# Patient Record
Sex: MALE | Race: WHITE | Employment: OTHER | URBAN - METROPOLITAN AREA
[De-identification: names, ages, dates, MRNs, and addresses within clinical notes are randomized per-mention and may not be internally consistent; named-entity substitution may affect disease eponyms.]

---

## 2020-01-15 ENCOUNTER — HOSPITAL ENCOUNTER (OUTPATIENT)
Dept: GENERAL RADIOLOGY | Age: 75
Discharge: HOME | End: 2020-01-15
Payer: MEDICARE

## 2020-01-15 DIAGNOSIS — T14.90XA SOFT TISSUE INJURY: ICD-10-CM

## 2020-01-15 PROCEDURE — 70140 X-RAY EXAM OF FACIAL BONES: CPT

## 2021-01-15 ENCOUNTER — OFFICE VISIT (OUTPATIENT)
Dept: NEUROLOGY | Age: 76
End: 2021-01-15
Payer: MEDICARE

## 2021-01-15 VITALS
WEIGHT: 247 LBS | DIASTOLIC BLOOD PRESSURE: 70 MMHG | BODY MASS INDEX: 36.58 KG/M2 | HEART RATE: 78 BPM | HEIGHT: 69 IN | RESPIRATION RATE: 16 BRPM | SYSTOLIC BLOOD PRESSURE: 140 MMHG | OXYGEN SATURATION: 98 %

## 2021-01-15 DIAGNOSIS — R41.3 MEMORY LOSS: Primary | ICD-10-CM

## 2021-01-15 DIAGNOSIS — G47.52 RBD (REM BEHAVIORAL DISORDER): ICD-10-CM

## 2021-01-15 DIAGNOSIS — G20 PARKINSONISM, UNSPECIFIED PARKINSONISM TYPE (HCC): ICD-10-CM

## 2021-01-15 PROCEDURE — 99205 OFFICE O/P NEW HI 60 MIN: CPT | Performed by: PSYCHIATRY & NEUROLOGY

## 2021-01-15 PROCEDURE — 1101F PT FALLS ASSESS-DOCD LE1/YR: CPT | Performed by: PSYCHIATRY & NEUROLOGY

## 2021-01-15 PROCEDURE — G8417 CALC BMI ABV UP PARAM F/U: HCPCS | Performed by: PSYCHIATRY & NEUROLOGY

## 2021-01-15 PROCEDURE — G8432 DEP SCR NOT DOC, RNG: HCPCS | Performed by: PSYCHIATRY & NEUROLOGY

## 2021-01-15 PROCEDURE — G8536 NO DOC ELDER MAL SCRN: HCPCS | Performed by: PSYCHIATRY & NEUROLOGY

## 2021-01-15 PROCEDURE — G8427 DOCREV CUR MEDS BY ELIG CLIN: HCPCS | Performed by: PSYCHIATRY & NEUROLOGY

## 2021-01-15 RX ORDER — CLOPIDOGREL BISULFATE 75 MG/1
TABLET ORAL
COMMUNITY

## 2021-01-15 RX ORDER — GLUCOSAMINE SULFATE 1500 MG
POWDER IN PACKET (EA) ORAL DAILY
COMMUNITY

## 2021-01-15 RX ORDER — FELODIPINE 5 MG/1
5 TABLET, EXTENDED RELEASE ORAL DAILY
COMMUNITY

## 2021-01-15 RX ORDER — CARBIDOPA AND LEVODOPA 25; 100 MG/1; MG/1
TABLET ORAL
Qty: 90 TAB | Refills: 1 | Status: SHIPPED | OUTPATIENT
Start: 2021-01-15 | End: 2021-03-15 | Stop reason: SINTOL

## 2021-01-15 RX ORDER — PRAVASTATIN SODIUM 40 MG/1
40 TABLET ORAL
COMMUNITY

## 2021-01-15 RX ORDER — LISINOPRIL 10 MG/1
40 TABLET ORAL DAILY
COMMUNITY

## 2021-01-15 NOTE — PROGRESS NOTES
Neurology Consult Note      HISTORY PROVIDED BY: patient and spouse    Chief Complaint:   Chief Complaint   Patient presents with    Memory Loss      Subjective: Jose Manuel Mijares is a 67 y.o. right handed male who presents in consultation for memory loss. He has noticed difficulty with memory along with his wife. He gives example of going into a room and not remembering why he went in there. He feels it is age related changes. He is driving and has not gotten lost. His wife gives an example of repeating questions. He has had trouble putting hearing aids in and forgetting how to put his meds together. He had a fall recently and realized he had been putting his meds in the pill box incorrectly. Often asks where are they going and what they are doing repeatedly. She started noticing trouble a year ago, worse in last 6 months. No family h/o memory loss. He has started shuffling, soft voice, messy hand writing. No tremor. He has fallen out of bed several times, screams and yells and moving arms during sleep. No snoring. Falling asleep during day time, always has. Up during night to go to the bathroom. He had an MRI brain yesterday, I do not have the report. Notes from PCP reviewed: Office visit 12/18/2020-mentions difficulty keeping up with medications, difficulty getting his hearing aids in, episodes of confusion with the timing of things, very active nightmares and 2 episodes of falling out of bed. Notes the patient had difficulty with his clock drawing. MRI ordered and referred to neurology.   Labs 12/18/2020: TSH 2.457, B12 505, CMP unremarkable, CBC unremarkable, LDL 53.4, HDL 54    Past Medical History:   Diagnosis Date    Breast cancer ([facility] [address] 2007    s/p mastectomy and chemo    HTN (hypertension)     Retinal artery occlusion     left eye    Vitamin D deficiency       Past Surgical History:   Procedure Laterality Date    HX LAP CHOLECYSTECTOMY      HX MASTECTOMY        Social History Socioeconomic History    Marital status:      Spouse name: Not on file    Number of children: Not on file    Years of education: Not on file    Highest education level: Not on file   Occupational History    Occupation: Retired, sold men's wear to [employer] resource strain: Not on file    Food insecurity     Worry: Not on file     Inability: Not on file   [employer] [place] [employer] needs     Medical: Not on file     Non-medical: Not on file   Tobacco Use    Smoking status: Former Smoker     Quit date:      Years since quittin.1    Smokeless tobacco: Never Used   Substance and Sexual Activity    Alcohol use: Yes     Alcohol/week: 2.0 standard drinks     Types: 2 Shots of liquor per week    Drug use: Never    Sexual activity: Not on file   Lifestyle    Physical activity     Days per week: Not on file     Minutes per session: Not on file    Stress: Not on file   Relationships    Social connections     Talks on phone: Not on file     Gets together: Not on file     Attends Orthodox service: Not on file     Active member of club or organization: Not on file     Attends meetings of clubs or organizations: Not on file     Relationship status: Not on file    Intimate partner violence     Fear of current or ex partner: Not on file     Emotionally abused: Not on file     Physically abused: Not on file     Forced sexual activity: Not on file   Other Topics Concern    Not on file   Social History Narrative    Lives in [place] with wife     Family History   Problem Relation Age of Onset    Cancer Mother     Heart Disease Mother     Cancer Father          Objective:   Review of Systems   Constitutional: Positive for malaise/fatigue. HENT: Positive for hearing loss. Eyes: Negative. Left eye vision loss   Respiratory: Negative. Cardiovascular: Positive for leg swelling. Gastrointestinal: Negative. Genitourinary: Negative.     Musculoskeletal: Positive for falls and joint pain. Skin: Positive for rash (cellulitis). Neurological: Negative. Endo/Heme/Allergies: Negative. Psychiatric/Behavioral: Positive for memory loss. No Known Allergies     Meds:  Outpatient Medications Prior to Visit   Medication Sig Dispense Refill    aspirin-calcium carbonate 81 mg-300 mg calcium(777 mg) tab Take 81 mg by mouth daily.  folic acid/multivit-min/lutein (CENTRUM SILVER PO) Take  by mouth.  cholecalciferol (Vitamin D3) 25 mcg (1,000 unit) cap Take  by mouth daily.  felodipine (PLENDIL SR) 5 mg 24 hr tablet Take 5 mg by mouth daily.  lisinopriL (PRINIVIL, ZESTRIL) 10 mg tablet Take  by mouth daily.  clopidogreL (Plavix) 75 mg tab Take  by mouth.  pravastatin (PRAVACHOL) 40 mg tablet Take 40 mg by mouth nightly. No facility-administered medications prior to visit. Imaging:  MRI Results (most recent):  No results found for this or any previous visit. CT Results (most recent):  Results from Hospital Encounter encounter on 11/26/14   CTA NECK    Narrative **Final Report**       ICD Codes / Adm. Diagnosis: 362.34   / Transient arterial occlusion o    Examination:  CT NECK ANGIOGRAPHY  - [id number] - Nov 26 2014  8:39AM  Accession No:  [id number]  Reason:  Transient arterial occlusion of retina      REPORT:  EXAM:  CT NECK ANGIOGRAPHY    INDICATION:  Transient arterial occlusion of retina    COMPARISON:  CT angiogram head of 11/25/2014    TECHNIQUE:    Multislice helical axial CT angiography was performed from the aortic arch   to the skull base during uneventful rapid bolus intravenous administration   of 97 mL of Optiray 350. Postprocessing was performed to include MIP and   volume rendered images. CTA NECK  The patient has an aberrant right subclavian artery. Aortic arch anatomy is   otherwise normal. There is mild calcified plaque in the proximal subclavian   arteries bilaterally, without significant stenosis.  The common carotid arteries are patent bilaterally. There is calcified plaque at the right   carotid bifurcation. This results in severe (70-80%) luminal diameter   stenosis. Milder calcified plaque is present at the left carotid bifurcation   resulting in approximately 60% diameter stenosis. NASCET method was utilized   for calculating stenosis. Cavernous carotid calcification with moderate   right internal carotid and mild left internal carotid stenosis is noted, as   demonstrated on the CT angiogram of 11/25/2014. The vertebral arteries are   codominant and patent. The cervical soft tissues are unremarkable. There   are degenerative changes in the cervical spine. IMPRESSION:  1. Calcified plaque in the right carotid bifurcation with severe (greater   than 70%) stenosis. 2. Mild calcified plaque in the left carotid bifurcation with moderate   (approximately 60%) stenosis. 3. Bilaterally patent vertebral arteries. 4. Aberrant right subclavian artery. This is a normal anatomic variant which   is usually asymptomatic but which can occasionally result in dysphagia. Signing/Reading Doctor: Kg Mcnair ([id number])    Approved: Kg Mcnair ([id number])  Nov 26 2014  9:47AM                                    Reviewed [facility] in [facility] and media tab today    Lab Review   Results for orders placed or performed during the hospital encounter of 11/25/14   POC CREATININE   Result Value Ref Range    Creatinine (POC) 1.0 0.6 - 1.3 MG/DL    GFRAA, POC >60 >60 ml/min/1.73m2    GFRNA, POC >60 >60 ml/min/1.73m2        Exam:  Visit Vitals  BP (!) 140/70 (BP 1 Location: Left arm, BP Patient Position: Sitting)   Pulse 78   Resp 16   Ht 5' 9\" (1.753 m)   Wt 247 lb (112 kg)   SpO2 98%   BMI 36.48 kg/m²     General:  Alert, cooperative, no distress. Head:  Normocephalic, without obvious abnormality, atraumatic.    Respiratory:  Heart:   Non labored breathing  Regular rate and rhythm, no murmurs   Neck:   2+ carotids, no bruits   Extremities: Warm, no cyanosis or edema. Pulses: 2+ radial pulses. Neurologic:  MS: Alert, speech- hypophonia. Language intact - see MMSE. Attention and fund of knowledge appropriate. Cranial Nerves:  II: visual fields Full to confrontation   II: pupils Equal, round, reactive to light   II: optic disc    III,VII: ptosis none   III,IV,VI: extraocular muscles  EOMI, no nystagmus or diplopia   V: facial light touch sensation  normal   VII: facial muscle function   symmetric   VIII: hearing intact   IX: soft palate elevation  normal   XI: trapezius strength  5/5   XI: sternocleidomastoid strength 5/5   XII: tongue  Midline     Motor: normal bulk and tone, no tremor              Strength: 5/5 throughout, no PD  Sensory: intact to LT, PP  Coordination: FTN and HTS intact, JESUS intact  Gait: Slow shuffling gait, dec arm swing, no tremor  Reflexes: 2+ symmetric, toes downgoing  Mini Mental State Exam 1/15/2021   What is the Year 1   What is the Season 1   What is the Date 1   What is the Day 1   What is the Month 1   Where are we State 1   Where are we Country 1   Where are we [place] [facility] 1   Where are we Floor 1   Name three objects, then ask the patient to say them 3   Serial sevens Subtract 7 from 100 in increments 3   Ask for the three objects repeated above 2   Name a pencil 1   Name a watch 1   Have the patient repeat this phrase \"No ifs, ands, or buts\" 1   Three stage command: Take the paper in your right hand 1   Fold the paper in half 0   Put the paper on the floor 0   Read and obey the following: CLOSE YOUR EYES 1   Have the patient write a sentence 1   Have the patient copy a figure 1   Mini Mental Score 25            Assessment/Plan   Pt is a 67 y.o. right handed male with one year h/o progressive memory loss noticed by his wife, giving examples of trouble putting hearing aids in correctly, forgetting how to take his medications correctly, repeating himself.  Additionally, has shuffling, soft voice, messy hand writing, has fallen out of bed several times, screams and yells and moving arms during sleep. [facility] brain [facility]. Exam with MMSE score 25/30 missing 2 attn, 1 at delayed recall, 2 steps, hypophonia, shuffling gait, dec arm swing. Patient's history is concerning for dementia and exam with parkinsonism, primary versus secondary to other neurodegenerative process. Discussed the possibility of normal pressure hydrocephalus. Will request MRI of the brain with and without contrast report and imaging on CD to assess for structural etiology of memory loss as well as parkinsonism. We will start a diagnostic and empiric trial of carbidopa-levodopa  mg, 1/2 tablet 3 times daily x2 weeks then 1 tablet 3 times daily, side effects and goals of therapy reviewed. History also suggestive of REM behavior disorder, diagnosis discussed and treatment with benzodiazepine, patient has not gotten out of bed with his dreams, would like to hold off on starting medication. Recommend neuropsychological testing to evaluate for dementia. Recommend follow-up in clinic in approximately 2 months, instructed to call in the interim with any questions or concerns. ICD-10-CM ICD-9-CM    1. Memory loss  R41.3 780.93 REFERRAL TO NEUROPSYCHOLOGY   2. Parkinsonism, unspecified Parkinsonism type ([facility] [address]  G20 332.0    3. RBD (REM behavioral disorder)  G47.52 327.42        Signed:   Arnoldo Fagan MD  1/15/2021

## 2021-01-15 NOTE — LETTER
2/18/2021 
 
Patient: Valentin Mota  
YOB: 1945  
Date of Visit: 1/15/2021  
 
Saul Mitchell MD 
[address] [place], [place] [id number] 
Via Fax: [phone] 
 
Dear Saul Mitchell MD, 
 
 
Thank you for referring Mr. Valentin Mota to [facility] for evaluation. My notes for this consultation are attached. 
 
If you have questions, please do not hesitate to call me. I look forward to following your patient along with you. 
 
 
Sincerely, 
 
Kaylee Lucio MD

## 2021-02-19 ENCOUNTER — DOCUMENTATION ONLY (OUTPATIENT)
Dept: NEUROLOGY | Age: 76
End: 2021-02-19

## 2021-02-19 NOTE — PROGRESS NOTES
Received report on MRI brain with and without contrast 1/13/2021 at [facility] imaging:  No acute pathology, chronic ischemic white matter changes seen, global atrophy, no enhancing lesions. Arterial flow voids at [place] maintained.  (sent to scanning.)

## 2021-02-23 ENCOUNTER — TELEPHONE (OUTPATIENT)
Dept: NEUROLOGY | Age: 76
End: 2021-02-23

## 2021-02-23 NOTE — TELEPHONE ENCOUNTER
Pt's wife would like a call in regards to medication carbidopa-levodopa, states its not helping and feels like it is making some things worse.  Please call

## 2021-02-26 NOTE — TELEPHONE ENCOUNTER
Patient's wife, Ford Mg called, verified and advised per Dr. Debbie Edward recommendations. She stated \" I did not see any change in his walking, it seemed like he was walking better for a day or two and then it all went back to how it was before. \" Ford Mg, patient's wife verbalize that she will go ahead and stop there Sinemet and plan to keep the follow up on 3/15/2021

## 2021-02-26 NOTE — TELEPHONE ENCOUNTER
Sang Cordero - Please call pt's wife:  Just have them stop the Sinemet, he is on a low dose so no need to taper it. Ask if she noticed any improvement in his walking.

## 2021-02-26 NOTE — TELEPHONE ENCOUNTER
Spoke to the patient's Angelica Portillo, who states Brigitte Whipple has been getting more and more confused and is scratching his hands persistently, should he still be on the Carbidopa Levodopa, because it seems to be getting worse? \"

## 2021-03-10 RX ORDER — CARBIDOPA AND LEVODOPA 25; 100 MG/1; MG/1
TABLET ORAL
Qty: 90 TAB | Refills: 1 | OUTPATIENT
Start: 2021-03-10

## 2021-03-15 ENCOUNTER — OFFICE VISIT (OUTPATIENT)
Dept: NEUROLOGY | Age: 76
End: 2021-03-15
Payer: MEDICARE

## 2021-03-15 VITALS
SYSTOLIC BLOOD PRESSURE: 130 MMHG | HEIGHT: 70 IN | DIASTOLIC BLOOD PRESSURE: 70 MMHG | BODY MASS INDEX: 34.99 KG/M2 | OXYGEN SATURATION: 96 % | WEIGHT: 244.4 LBS | HEART RATE: 70 BPM

## 2021-03-15 DIAGNOSIS — F02.80 LATE ONSET ALZHEIMER'S DISEASE WITHOUT BEHAVIORAL DISTURBANCE (HCC): Primary | ICD-10-CM

## 2021-03-15 DIAGNOSIS — G30.1 LATE ONSET ALZHEIMER'S DISEASE WITHOUT BEHAVIORAL DISTURBANCE (HCC): Primary | ICD-10-CM

## 2021-03-15 PROCEDURE — G8417 CALC BMI ABV UP PARAM F/U: HCPCS | Performed by: PSYCHIATRY & NEUROLOGY

## 2021-03-15 PROCEDURE — 1101F PT FALLS ASSESS-DOCD LE1/YR: CPT | Performed by: PSYCHIATRY & NEUROLOGY

## 2021-03-15 PROCEDURE — G8427 DOCREV CUR MEDS BY ELIG CLIN: HCPCS | Performed by: PSYCHIATRY & NEUROLOGY

## 2021-03-15 PROCEDURE — G8536 NO DOC ELDER MAL SCRN: HCPCS | Performed by: PSYCHIATRY & NEUROLOGY

## 2021-03-15 PROCEDURE — 99214 OFFICE O/P EST MOD 30 MIN: CPT | Performed by: PSYCHIATRY & NEUROLOGY

## 2021-03-15 PROCEDURE — G8432 DEP SCR NOT DOC, RNG: HCPCS | Performed by: PSYCHIATRY & NEUROLOGY

## 2021-03-15 RX ORDER — DONEPEZIL HYDROCHLORIDE 10 MG/1
TABLET, FILM COATED ORAL
Qty: 90 TAB | Refills: 3 | Status: SHIPPED | OUTPATIENT
Start: 2021-03-15 | End: 2022-04-27

## 2021-03-15 NOTE — LETTER
3/15/2021 Patient: Elijah Lopez YOB: 1945 Date of Visit: 3/15/2021 Chris Blackwood MD 
[address] [facility] [address] [place] [address] Via Fax: [phone] Dear Chris Blackwood MD, Thank you for referring Mr. Bay Tian to [address] [place] [address] for evaluation. My notes for this consultation are attached. If you have questions, please do not hesitate to call me. I look forward to following your patient along with you. Sincerely, Rebekah Johnson MD

## 2021-03-15 NOTE — PROGRESS NOTES
Neurology Consult Note      HISTORY PROVIDED BY: patient and spouse    Chief Complaint:   Chief Complaint   Patient presents with    Follow-up    Memory Loss    Parkinsons Disease      Subjective:   Pt is a 67 y.o. right handed male initially and last seen on 1/15/21 with one year h/o progressive memory loss noticed by his wife, giving examples of trouble putting hearing aids in correctly, forgetting how to take his medications correctly, repeating himself. Additionally, has shuffling, soft voice, messy hand writing, has fallen out of bed several times, screams and yells and moving arms during sleep. [facility]. Exam with MMSE score 25/30 missing 2 attn, 1 at delayed recall, 2 steps, hypophonia, shuffling gait, dec arm swing. Patient's history was concerning for dementia and exam with parkinsonism, primary versus secondary to other neurodegenerative process. Discussed the possibility of normal pressure hydrocephalus. Requested MRI of the brain with and without contrast report and imaging on CD to assess for structural etiology of memory loss as well as parkinsonism. Started a diagnostic and empiric trial of carbidopa-levodopa  mg, 1/2 tablet 3 times daily x2 weeks then 1 tablet 3 times daily. History also suggestive of REM behavior disorder, diagnosis discussed and treatment with benzodiazepine, patient has not gotten out of bed with his dreams, would like to hold off on starting medication. Recommended neuropsychological testing to evaluate for dementia. He returns for f/u. He only took the Sinemet for one week, became confused and was scratching his hands persistently, so stopped it, now almost back to baseline. Still having trouble remembering how to do things such as use the microwave. He was hallucinating as well, also resolved. He also was evaluated for infection including UTI at the time.  She did not noticed any improvement in walking and in fact he fell while taking the Sinemet. She did notice improvement in volume of voice for a day or two, but then was low again. MRI brain with and without contrast 2021 at [facility] imaging report reviewed, no acute pathology, chronic ischemic white matter changes seen, global atrophy, no enhancing lesions. Arterial flow voids at [place] maintained. He c/o knee pain with walking, no back pain. He was going to PT prior to falling, planning on getting back to PT. Has not been acting out dreams. They put a rail on the bed. Previous Testing:  Labs 2020: TSH 2.457, B12 505, CMP unremarkable, CBC unremarkable, LDL 53.4, HDL 54    Past Medical History:   Diagnosis Date    Breast cancer ([facility] [address]     s/p mastectomy and chemo    HTN (hypertension)     Retinal artery occlusion     left eye    Vitamin D deficiency       Past Surgical History:   Procedure Laterality Date    HX LAP CHOLECYSTECTOMY      HX MASTECTOMY        Social History     Socioeconomic History    Marital status:      Spouse name: Not on file    Number of children: Not on file    Years of education: Not on file    Highest education level: Not on file   Occupational History    Occupation: Retired, sold men's wear to "[employer]   Social Needs    Financial resource strain: Not on file    Food insecurity     Worry: Not on file     Inability: Not on file   [employer] needs     Medical: Not on file     Non-medical: Not on file   Tobacco Use    Smoking status: Former Smoker     Quit date:      Years since quittin.2    Smokeless tobacco: Never Used   Substance and Sexual Activity    Alcohol use:  Yes     Alcohol/week: 2.0 standard drinks     Types: 2 Shots of liquor per week    Drug use: Never    Sexual activity: Not on file   Lifestyle    Physical activity     Days per week: Not on file     Minutes per session: Not on file    Stress: Not on file   Relationships    Social connections     Talks on phone: Not on file     Gets together: Not on file     Attends Sabianism service: Not on file     Active member of club or organization: Not on file     Attends meetings of clubs or organizations: Not on file     Relationship status: Not on file    Intimate partner violence     Fear of current or ex partner: Not on file     Emotionally abused: Not on file     Physically abused: Not on file     Forced sexual activity: Not on file   Other Topics Concern    Not on file   Social History Narrative    Lives in [place] with wife     Family History   Problem Relation Age of Onset    Cancer Mother     Heart Disease Mother     Cancer Father          Objective:   ROS: Per HPI o/w reviewed and neg    No Known Allergies     Meds:    Current Outpatient Medications:     aspirin-calcium carbonate 81 mg-300 mg calcium(777 mg) tab, Take 81 mg by mouth daily. , Disp: , Rfl:     folic acid/multivit-min/lutein (CENTRUM SILVER PO), Take  by mouth., Disp: , Rfl:     cholecalciferol (Vitamin D3) 25 mcg (1,000 unit) cap, Take  by mouth daily. , Disp: , Rfl:     felodipine (PLENDIL SR) 5 mg 24 hr tablet, Take 5 mg by mouth daily. , Disp: , Rfl:     lisinopriL (PRINIVIL, ZESTRIL) 10 mg tablet, Take  by mouth daily. , Disp: , Rfl:     clopidogreL (Plavix) 75 mg tab, Take  by mouth., Disp: , Rfl:     pravastatin (PRAVACHOL) 40 mg tablet, Take 40 mg by mouth nightly., Disp: , Rfl:       Imaging:  MRI Results (most recent):  No results found for this or any previous visit. CT Results (most recent):  Results from Hospital Encounter encounter on 11/26/14   CTA NECK    Narrative **Final Report**       ICD Codes / Adm. Diagnosis: 362.34   / Transient arterial occlusion o    Examination:  CT NECK ANGIOGRAPHY  - [id number] - Nov 26 2014  8:39AM  Accession No:  [id number]  Reason:  Transient arterial occlusion of retina      REPORT:  EXAM:  CT NECK ANGIOGRAPHY    INDICATION:  Transient arterial occlusion of retina    COMPARISON:  CT angiogram head of 11/25/2014    TECHNIQUE: Multislice helical axial CT angiography was performed from the aortic arch   to the skull base during uneventful rapid bolus intravenous administration   of 97 mL of Optiray 350. Postprocessing was performed to include MIP and   volume rendered images. CTA NECK  The patient has an aberrant right subclavian artery. Aortic arch anatomy is   otherwise normal. There is mild calcified plaque in the proximal subclavian   arteries bilaterally, without significant stenosis. The common carotid   arteries are patent bilaterally. There is calcified plaque at the right   carotid bifurcation. This results in severe (70-80%) luminal diameter   stenosis. Milder calcified plaque is present at the left carotid bifurcation   resulting in approximately 60% diameter stenosis. NASCET method was utilized   for calculating stenosis. Cavernous carotid calcification with moderate   right internal carotid and mild left internal carotid stenosis is noted, as   demonstrated on the CT angiogram of 11/25/2014. The vertebral arteries are   codominant and patent. The cervical soft tissues are unremarkable. There   are degenerative changes in the cervical spine. IMPRESSION:  1. Calcified plaque in the right carotid bifurcation with severe (greater   than 70%) stenosis. 2. Mild calcified plaque in the left carotid bifurcation with moderate   (approximately 60%) stenosis. 3. Bilaterally patent vertebral arteries. 4. Aberrant right subclavian artery. This is a normal anatomic variant which   is usually asymptomatic but which can occasionally result in dysphagia.               Signing/Reading Doctor: Madan Pace ([id number])    Approved: Chestine Loss ([id number])  Nov 26 2014  9:47AM                                    Reviewed records in [facility] and media tab today    Lab Review   Results for orders placed or performed during the hospital encounter of 11/25/14   POC CREATININE   Result Value Ref Range    Creatinine (POC) 1.0 0.6 - 1.3 MG/DL    GFRAA, POC >60 >60 ml/min/1.73m2    GFRNA, POC >60 >60 ml/min/1.73m2        Exam:  Visit Vitals  /70   Pulse 70   Ht 5' 10\" (1.778 m)   Wt 244 lb 6.4 oz (110.9 kg)   SpO2 96%   BMI 35.07 kg/m²     General:  Alert, cooperative, no distress. Head:  Normocephalic, without obvious abnormality, atraumatic. Respiratory:  Heart:   Non labored breathing  Regular rate and rhythm, no murmurs   Neck:      Extremities: Warm, no cyanosis or edema. Pulses: 2+ radial pulses. Neurologic:  MS: Alert, speech- hypophonia. Language intact - see MMSE. Attention and fund of knowledge appropriate.    Cranial Nerves:  II: visual fields    II: pupils    II: optic disc    III,VII: ptosis none   III,IV,VI: extraocular muscles  EOMI, no nystagmus or diplopia   V: facial light touch sensation     VII: facial muscle function   symmetric   VIII: hearing Intact with hearing aids   IX: soft palate elevation     XI: trapezius strength     XI: sternocleidomastoid strength    XII: tongue       Motor: normal bulk and tone, no tremor,(At Jan, 2020 OV:  Strength: 5/5 throughout, no PD)  Sensory:(At Jan, 2020 OV:  intact to , [place])  Coordination: (At Jan, 2020 OV: FTN and HTS intact, JESUS intact)  Gait: Slow shuffling gait, improved arm swing, no tremor  Reflexes: (At Jan, 2020 OV: 2+ symmetric, toes downgoing)  Mini Mental State Exam 1/15/2021   What is the Year 1   What is the Season 1   What is the Date 1   What is the Day 1   What is the Month 1   Where are we State 1   Where are we Country 1   Where are we [place] or [place] 1   Where are we Floor 1   Name three objects, then ask the patient to say them 3   Serial sevens Subtract 7 from 100 in increments 3   Ask for the three objects repeated above 2   Name a pencil 1   Name a watch 1   Have the patient repeat this phrase \"No ifs, ands, or buts\" 1   Three stage command: Take the paper in your right hand 1   Fold the paper in half 0   Put the paper on the floor 0   Read and obey the following: CLOSE YOUR EYES 1   Have the patient write a sentence 1   Have the patient copy a figure 1   Mini Mental Score 25            Assessment/Plan   Pt is a 67 y.o. right handed male presenting in Jan, 2021 with one year h/o progressive memory loss noticed by his wife, giving examples of trouble putting hearing aids in correctly, forgetting how to take his medications correctly, repeating himself. Additionally, has shuffling, soft voice, messy hand writing, has fallen out of bed several times, screams and yells and moving arms during sleep. [facility] per report, CIWM changes and atrophy, no mention of ventriculomegaly or signs of NPH. Pt did not have improvement in parkinsonism on Sinemet and did have increased confusion and hallucinations. Exam in January with MMSE score 25/30, not repeated today. He has mild hypophonia, shuffling gait, improved arm swing. Patient's history is concerning for dementia, probable Alzheimer's, and secondary parkinsonism. OA of bilateral knees is also contributing to gait disturbance. History also suggestive of REM behavior disorder, not acting out dreams recently, has not fallen out of bed with rail on bed. Start Aricept 5mg daily x 1 month, then 10mg daily, side effects reviewed. He is going to wait until they return from [place] in June to start it. Recommend neuropsychological testing to evaluate for dementia. They will call if RBD becomes a problem. Recommend follow-up in clinic in 7 months, instructed to call in the interim with any questions or concerns. ICD-10-CM ICD-9-CM    1. Late onset Alzheimer's disease without behavioral disturbance ([facility] [address]  G30.1 331.0     F02.80 294.10      I spent a total of 35 minutes in both face-to-face activities and non-face-to-face activities for the visit on the date of this encounter. Signed:   Marla Mcqueen MD  3/15/2021

## 2021-11-12 ENCOUNTER — OFFICE VISIT (OUTPATIENT)
Dept: NEUROLOGY | Age: 76
End: 2021-11-12
Payer: MEDICARE

## 2021-11-12 DIAGNOSIS — F41.8 ANXIETY ABOUT HEALTH: ICD-10-CM

## 2021-11-12 DIAGNOSIS — F02.80 LATE ONSET ALZHEIMER'S DISEASE WITHOUT BEHAVIORAL DISTURBANCE (HCC): Primary | ICD-10-CM

## 2021-11-12 DIAGNOSIS — G30.1 LATE ONSET ALZHEIMER'S DISEASE WITHOUT BEHAVIORAL DISTURBANCE (HCC): Primary | ICD-10-CM

## 2021-11-12 PROCEDURE — 90791 PSYCH DIAGNOSTIC EVALUATION: CPT | Performed by: CLINICAL NEUROPSYCHOLOGIST

## 2021-11-12 NOTE — PROGRESS NOTES
[address] [facility],[address]  [address]   [place] [address] [facility], [facility] [address]   [phone] Office   [phone] Fax      Neuropsychology    Initial Diagnostic Interview Note      Referral:  Tish Tian MD, Dr. Kellie Hoang Jeremías New is a 67 y.o. right handed   male  who was accompanied by his spouse to the initial clinical interview on 11/12/21 . Please refer to his medical records for details pertaining to his history. At the start of the appointment, I reviewed the patient's [facility] Epic Chart (including Media scanned in from previous providers) for the active Problem List, all pertinent Past Medical Hx, medications, recent radiologic and laboratory findings. In addition, I reviewed pt's documented Immunization Record and Encounter History. High school completed and denied any history of previously diagnosed LD and/or receipt of special education services. He is retired and he worked with his spouse in [employer]. He feels like his memory is fine. He has not been driving for awhile. He has had some blood pressure issues and some medication changes. He states he is independent for his medications (uses a pillbox- spouse says she does that). Spouse has always done the finances. ADLs- independent. Spouse notes there is a one year decline in short term memory. He began by having problems completing sentences and also difficulties with focus and cognitive decline. Goes into a room and forgets why. Speech had declined. Walking became shuffling. She was concerned about Parkinson's. He had been having terrible nightmares. He had been confused about the day. He has had problems remembering his hearing aids. Things like that. He has fallen out of bed several times, screams and yells and moving arms during sleep. No snoring. Falling asleep during day time, always has.  Up during night to go to the bathroom. Put on Levidopa/Carvidopa and started hallucinating and was wandering and that med was of course stopped. MRI was done of the Brain which showed no acute pathology, chronic ischemic white matter changes seen, global atrophy, no enhancing lesions.  Arterial flow voids at [place] was well maintained. Aricept was started and he is doing better. He is initiating phone calls, is more alert. No known family history of dementia. He has stopped reading. Seems less motivated/interested in doing things. Appetite is good. No changes In sense of taste or smell. No previous neuropsych.      Neuropsychological Mental Status Exam (NMSE):      Historian: Good  Praxis: No UE apraxia  R/L Orientation: Intact to self and to other  Dress: within normal limits   Weight: overweight   Appearance/Hygiene: within normal limits   Gait: Slow, without assistance   Assistive Devices: Glasses, hearing aids  Mood: within normal limits   Affect: within normal limits   Comprehension: within normal limits   Thought Process: within normal limits   Expressive Language: within normal limits   Receptive Language: within normal limits   Motor:  No cognitive perseveration, mild tremor in hands  ETOH: Rarely, not to excess  Tobacco: Denied  Illicit: Denied  SI/HI: Denied  Psychosis: Denied- just the once on carbidopa  Insight: Within normal limits  Judgment: Within normal limits  Other Psych:      Past Medical History:   Diagnosis Date    Breast cancer ([facility] [address] 2007    s/p mastectomy and chemo    HTN (hypertension)     Retinal artery occlusion     left eye    Vitamin D deficiency        Past Surgical History:   Procedure Laterality Date    HX LAP CHOLECYSTECTOMY      HX MASTECTOMY         No Known Allergies    Family History   Problem Relation Age of Onset    Cancer Mother     Heart Disease Mother     Cancer Father        Social History     Tobacco Use    Smoking status: Former Smoker     Quit date: 1999 Years since quittin.8    Smokeless tobacco: Never Used   Substance Use Topics    Alcohol use: Yes     Alcohol/week: 2.0 standard drinks     Types: 2 Shots of liquor per week    Drug use: Never       Current Outpatient Medications   Medication Sig Dispense Refill    donepeziL (Aricept) 10 mg tablet Take 1/2 tab by mouth daily x 1 month, then take 1 tab daily 90 Tab 3    aspirin-calcium carbonate 81 mg-300 mg calcium(777 mg) tab Take 81 mg by mouth daily.  folic acid/multivit-min/lutein (CENTRUM SILVER PO) Take  by mouth.  cholecalciferol (Vitamin D3) 25 mcg (1,000 unit) cap Take  by mouth daily.  felodipine (PLENDIL SR) 5 mg 24 hr tablet Take 5 mg by mouth daily.  lisinopriL (PRINIVIL, ZESTRIL) 10 mg tablet Take  by mouth daily.  clopidogreL (Plavix) 75 mg tab Take  by mouth.  pravastatin (PRAVACHOL) 40 mg tablet Take 40 mg by mouth nightly. Plan:  Obtain authorization for testing from insurance company. Report to follow once testing, scoring, and interpretation completed. ? Organic based neurocognitive issues versus mood disorder or combination of same. ? Problems organic, functional, or both? This note will not be viewable in [address]

## 2022-01-26 ENCOUNTER — OFFICE VISIT (OUTPATIENT)
Dept: NEUROLOGY | Age: 77
End: 2022-01-26
Payer: MEDICARE

## 2022-01-26 DIAGNOSIS — F02.80 LATE ONSET ALZHEIMER'S DISEASE WITHOUT BEHAVIORAL DISTURBANCE (HCC): Primary | ICD-10-CM

## 2022-01-26 DIAGNOSIS — G30.1 LATE ONSET ALZHEIMER'S DISEASE WITHOUT BEHAVIORAL DISTURBANCE (HCC): Primary | ICD-10-CM

## 2022-01-26 PROCEDURE — 96133 NRPSYC TST EVAL PHYS/QHP EA: CPT | Performed by: CLINICAL NEUROPSYCHOLOGIST

## 2022-01-26 PROCEDURE — 96138 PSYCL/NRPSYC TECH 1ST: CPT | Performed by: CLINICAL NEUROPSYCHOLOGIST

## 2022-01-26 PROCEDURE — 96139 PSYCL/NRPSYC TST TECH EA: CPT | Performed by: CLINICAL NEUROPSYCHOLOGIST

## 2022-01-26 PROCEDURE — 96137 PSYCL/NRPSYC TST PHY/QHP EA: CPT | Performed by: CLINICAL NEUROPSYCHOLOGIST

## 2022-01-26 PROCEDURE — 96132 NRPSYC TST EVAL PHYS/QHP 1ST: CPT | Performed by: CLINICAL NEUROPSYCHOLOGIST

## 2022-01-26 PROCEDURE — 96136 PSYCL/NRPSYC TST PHY/QHP 1ST: CPT | Performed by: CLINICAL NEUROPSYCHOLOGIST

## 2022-01-28 NOTE — PROGRESS NOTES
[address] [facility],5Th Floor  [address]   [address] [place] [address] [place] [address], [facility] [address]   [phone] Office   [phone] Fax      Neuropsychological Evaluation Report    Referral:  Nile Waddell MD, Dr. Martinez Evi Dumont is a 67 y.o. right handed   male  who was accompanied by his spouse to the initial clinical interview on 11/12/21 . Please refer to his medical records for details pertaining to his history. At the start of the appointment, I reviewed the patient's [facility] Epic Chart (including Media scanned in from previous providers) for the active Problem List, all pertinent Past Medical Hx, medications, recent radiologic and laboratory findings. In addition, I reviewed pt's documented Immunization Record and Encounter History. High school completed and denied any history of previously diagnosed LD and/or receipt of special education services. 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 He feels like his memory is fine. He has not been driving for awhile. He has had some blood pressure issues and some medication changes. He states he is independent for his medications (uses a pillbox- spouse says she does that). Spouse has always done the finances. ADLs- independent. Spouse notes there is a one year decline in short term memory. He began by having problems completing sentences and also difficulties with focus and cognitive decline. Goes into a room and forgets why. Speech had declined. Walking became shuffling. She was concerned about Parkinson's. He had been having terrible nightmares. He had been confused about the day. He has had problems remembering his hearing aids. Things like that. He has fallen out of bed several times, screams and yells and moving arms during sleep. No snoring. Falling asleep during day time, always has.  Up during night to go to the bathroom. Put on Levidopa/Carvidopa and started hallucinating and was wandering and that med was of course stopped. MRI was done of the Brain which showed no acute pathology, chronic ischemic white matter changes seen, global atrophy, no enhancing lesions.  Arterial flow voids at [place] was well maintained. Aricept was started and he is doing better. He is initiating phone calls, is more alert. No known family history of dementia. He has stopped reading. Seems less motivated/interested in doing things. Appetite is good. No changes In sense of taste or smell. No previous neuropsych.      Neuropsychological Mental Status Exam (NMSE):      Historian: Good  Praxis: No UE apraxia  R/L Orientation: Intact to self and to other  Dress: within normal limits   Weight: overweight   Appearance/Hygiene: within normal limits   Gait: Slow, without assistance   Assistive Devices: Glasses, hearing aids  Mood: within normal limits   Affect: within normal limits   Comprehension: within normal limits   Thought Process: within normal limits   Expressive Language: within normal limits   Receptive Language: within normal limits   Motor:  No cognitive perseveration, mild tremor in hands  ETOH: Rarely, not to excess  Tobacco: Denied  Illicit: Denied  SI/HI: Denied  Psychosis: Denied- just the once on carbidopa  Insight: Within normal limits  Judgment: Within normal limits  Other Psych:      Past Medical History:   Diagnosis Date    Breast cancer ([facility] [address] 2007    s/p mastectomy and chemo    HTN (hypertension)     Retinal artery occlusion     left eye    Vitamin D deficiency        Past Surgical History:   Procedure Laterality Date    HX LAP CHOLECYSTECTOMY      HX MASTECTOMY         No Known Allergies    Family History   Problem Relation Age of Onset    Cancer Mother     Heart Disease Mother     Cancer Father        Social History     Tobacco Use    Smoking status: Former Smoker     Quit date: 1999     Years since quittin.0    Smokeless tobacco: Never Used   Substance Use Topics    Alcohol use: Yes     Alcohol/week: 2.0 standard drinks     Types: 2 Shots of liquor per week    Drug use: Never       Current Outpatient Medications   Medication Sig Dispense Refill    donepeziL (Aricept) 10 mg tablet Take 1/2 tab by mouth daily x 1 month, then take 1 tab daily 90 Tab 3    aspirin-calcium carbonate 81 mg-300 mg calcium(777 mg) tab Take 81 mg by mouth daily.  folic acid/multivit-min/lutein (CENTRUM SILVER PO) Take  by mouth.  cholecalciferol (Vitamin D3) 25 mcg (1,000 unit) cap Take  by mouth daily.  felodipine (PLENDIL SR) 5 mg 24 hr tablet Take 5 mg by mouth daily.  lisinopriL (PRINIVIL, ZESTRIL) 10 mg tablet Take  by mouth daily.  clopidogreL (Plavix) 75 mg tab Take  by mouth.  pravastatin (PRAVACHOL) 40 mg tablet Take 40 mg by mouth nightly. Plan:  Obtain authorization for testing from insurance company. Report to follow once testing, scoring, and interpretation completed. ? Organic based neurocognitive issues versus mood disorder or combination of same. ? Problems organic, functional, or both? This note will not be viewable in [address] Neuropsychological Test Results  Patient Testing 22 Report Completed 22  A Psychometrist Assisted w/ portions of this evaluation while under my direct  supervision    The following evaluation procedures/tests were administered:      Neuropsychologist Administered/Interpreted:  Neuropsychological Mental Status Exam, Revised Memory & Behavior Checklist,  Mini Mental Status Exam, Clock Drawing Test, Test Of Premorbid Functioning, Shiraz-Melzack Pain Questionnaire, History Taking  & Clinical Interview With The Patient, Additional History Taking w/ The Patient's Spouse, ABAS-3, CASE, LE, CPT-III, Review Of Available Records.     Psychometrist Administered under Neuropsychologist Supervision & Neuropsychologist Interpreted:  Verbal Fluency Tests, Fitmoo, Trailmaking Test Parts A & B, Wechsler Adult Intelligence Scale - IV, [place] [employer]  3, Grooved Pegboard, Carlson Depression Inventory  II, Carlson Anxiety Inventory. Test Findings:  Test Findings:  Note:  The patients raw data have been compared with currently available norms which include demographic corrections for age, gender, and/or education. Sometimes, the patients scores are compared to demographically similar individuals as close to the patients age, education level, etc., as possible. \"Average\" is viewed as being +/- 1 standard deviation (SD) from the stated mean for a particular test score. \"Low average\" is viewed as being between 1 and 2 SD below the mean, and above average is viewed as being 1 and 2 SD above the mean. Scores falling in the borderline range (between 1-1/2 and 2 SD below the mean) are viewed with particular attention as to whether they are normal or abnormal neurocognitive test scores. Other methods of inference in analyzing the test data are also utilized, including the pattern and range of scores in the profile, bilateral motor functions, and the presence, if any, of pathognomonic signs. Behaviorally, the patient was friendly and cooperative and appeared motivated to perform well during this examination. Within this context, the results of this evaluation are viewed as a valid reflection of the patients actual neurocognitive and emotional status. His MMSE score of 26/30 correct was borderline. In this regard, he was not oriented to season or date. Recall for three words after a brief delay was 1/3 correct. Clock drawing was impaired. His category fluency was within the average range with a T score of 52. Confrontation naming ability, as assessed by the [facility] Revised, was within the above average range at 58/60 correct (T = 70).   This pattern of performance is not indicative of a patient who is at increased risk for day-to-day problems with verbal fluency and confrontation naming was normal.       The patient was administered the [facility] Continuous Performance Test  III,a computer administered test of sustained attention, and review of the subscales within this instrument revealed moderate concerns for inattentiveness without impulsivity. This pattern of performance is indicative of a patient who is at increased risk for day-to-day problems with sustained visual attention/concentration. The patient is not showing problems with working memory capacity (13th %ile) or processing speed (10th %ile) on the WAIS-IV. His Verbal Comprehension Index score of 98 was average. His Perceptual Reasoning Index score of 84 was low average. These do not scores reflect a decline in functioning based on an assessment of premorbid functioning. The patient was administered the [place] Verbal Learning Test  - 3 and generated an impaired range (and flat) learning curve over five repeated auditory word list learning trials. An interference trial was within the normal range. Free and cued, short and long delayed recall were all impaired. Recognition recall was low average (9th %ile) and forced choice recall was low (14/16 correct). No concern for malingering, however. This pattern of performance is indicative of a patient who is at increased risk for day-to-day problems with auditory learning and memory. Simple timed visual motor sequencing (Trailmaking Test Part A) was within the moderately to severely impaired range with a T score of 21. His performance on a similar, but more complex task of timed visual motor sequencing (Trailmaking Test Part B) was within the moderately to severely impaired range with a T score of 22. This latter test was discontinued.   Taken together, this pattern of performance is indicative of a patient who is at increased risk for day-to-day problems with executive functioning. Fine motor dexterity was within the moderately impaired range bilaterally. This pattern of performance is  indicative of a patient who is at increased risk for day-to-day problems with bilateral motor dexterity. The patient rated his current level of pain as \"0/5- No Pain\" on the Shiraz-Melzack Pain Questionnaire. His Carlson Depression Inventory- II score of 3 was within the normal range. His Carlson Anxiety Inventory score of 0 reflected minimal anxiety. The patient was administered the Personality Assessment Inventory and generated a valid profile for interpretation. Within this context, he can be impatient and somewhat demanding at times. The personality profile is otherwise normal.       The spouse completed the ABAS-3 and did not report clinically significant concerns regarding the patient's general adaptive skills (18th %ile), conceptual skills (18th %ile), social skills (89th %ile), or practical skills (16th %ile). On the CASE, the spouse reported concern regarding the patient's cognitive functioning. Impressions & Recommendations: This patient generated an abnormal range Neuropsychological Evaluation with respect to neurocognitive functioning. In this regard, he is showing problems with mental status, sustained attention,  auditory learning, auditory memory, bilateral fine motor dexterity, and executive functioning. At the same time, verbal fluency, verbal comprehension, perceptual reasoning, working memory, and processing speed remain normal. These strength will serve to mask underlying neurocognitive deficits at times. From an emotional standpoint, he denied clinically significant psychopathology. In my opinion, this profile is consistent with an evolving organic process that is currently at a mild to moderate level of severity. 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 Wife has ?  About Parkinson's and of course I defer to Neuro in that regard. In addition to continued medical care, my recommendations include continued treatment for memory, and consider tx for attention problems as well. His emotional status needs to be monitored over time. The patient should be encouraged to remain as mentally, socially, and physically active as possible. I will be sending for consult with Sleep Medicine. The patient's generated cognitive difficulties are significant to the degree whereby it is my opinion that he should not live independently without appropriate supervision for those domains with a heavy memory emphasis. This includes nutritional/mealp preparation supervision, medication management supervision and supervision of financial dealings. I agree that he should not be driving. I do not believe that he is competent to manage his own affairs at this time. Baseline now established. Follow up prn. Clinical correlation is, of course, indicated. I will discuss these findings with the patient and family when they follow up with me in the near future. A follow up Neuropsychological Evaluation is indicated on a prn basis. 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 If there is any additional information of which I am currently unaware, I would be more than happy to review it upon having it made available to me. Thank you for the opportunity to see this interesting individual.     Sincerely,       Roger Zamora. Joan Smart, [place], EdS      Attachments:  IQ Test Results (In Media Section Of This EMR)    Cc: Nevaeh Pennington MD    Time Documentation:    93056*2 65736*7 (60 minutes)    96138 x 1  96139 x 5 Test Administration/Data Gathering By Technician: (3 hours).  12475 x 1 (first 30 minutes), 64563 x 5 (each additional 30 minutes)    96132 x 1  96133 x 1 Testing Evaluation Services by Neuropsychologist (1 hour, 50 minutes) [phone] x 1 (first hour), 96133 x 1 (50 minutes)    Definitions:      [id number]/72536:  Neurobehavioral Status Exam, Clinical interview. Clinical assessment of thinking, reasoning and judgment, by neuropsychologist, both face to face time with patient and time interpreting those test results and reporting, first and subsequent hours)    36284/11660: Neuropsychological Test Administration by Technician/Psychometrist, first 30 minutes and each additional 30 minutes. The above includes: Record review. Review of history provided by patient. Review of collaborative information. Testing by Clinician. Review of raw data. Scoring. Report writing of individual tests administered by Clinician. Integration of individual tests administered by psychometrist with NSE/testing by clinician, review of records/history/collaborative information, case Conceptualization, treatment planning, clinical decision making, report writing, coordination Of Care. Psychometry test codes as time spent by psychometrist administering and scoring neurocognitive/psychological tests under supervision of neuropsychologist.  Integral services including scoring of raw data, data interpretation, case conceptualization, report writing etcetera were initiated after the patient finished testing/raw data collected and was completed on the date the report was signed. Please note that this dictation was completed with [email or web address], the [employer] voice recognition software. Quite often unanticipated grammatical, syntax, homophones, and other interpretive errors are inadvertently transcribed by the computer software. Please disregard these errors. Please excuse any errors that have escaped final proofreading. Thank you.

## 2022-01-31 ENCOUNTER — PATIENT MESSAGE (OUTPATIENT)
Dept: SLEEP MEDICINE | Age: 77
End: 2022-01-31

## 2022-02-02 ENCOUNTER — TELEPHONE (OUTPATIENT)
Dept: SLEEP MEDICINE | Age: 77
End: 2022-02-02

## 2022-02-02 NOTE — TELEPHONE ENCOUNTER
Phoned the patient to schedule a sleep consult per Keyur Abernathy. His wife stated that she had not received the results of his testing with Keyur Abernathy. She would like to hold off on scheduling for now until she speaks with Dr. Simona Cushing.

## 2022-02-08 ENCOUNTER — OFFICE VISIT (OUTPATIENT)
Dept: NEUROLOGY | Age: 77
End: 2022-02-08
Payer: MEDICARE

## 2022-02-08 VITALS
HEART RATE: 73 BPM | WEIGHT: 243 LBS | BODY MASS INDEX: 34.79 KG/M2 | OXYGEN SATURATION: 97 % | DIASTOLIC BLOOD PRESSURE: 80 MMHG | SYSTOLIC BLOOD PRESSURE: 130 MMHG | HEIGHT: 70 IN

## 2022-02-08 DIAGNOSIS — F02.80 LATE ONSET ALZHEIMER'S DISEASE WITHOUT BEHAVIORAL DISTURBANCE (HCC): Primary | ICD-10-CM

## 2022-02-08 DIAGNOSIS — G47.52 RBD (REM BEHAVIORAL DISORDER): ICD-10-CM

## 2022-02-08 DIAGNOSIS — G30.1 LATE ONSET ALZHEIMER'S DISEASE WITHOUT BEHAVIORAL DISTURBANCE (HCC): Primary | ICD-10-CM

## 2022-02-08 PROCEDURE — G8417 CALC BMI ABV UP PARAM F/U: HCPCS | Performed by: PSYCHIATRY & NEUROLOGY

## 2022-02-08 PROCEDURE — 99214 OFFICE O/P EST MOD 30 MIN: CPT | Performed by: PSYCHIATRY & NEUROLOGY

## 2022-02-08 PROCEDURE — G8510 SCR DEP NEG, NO PLAN REQD: HCPCS | Performed by: PSYCHIATRY & NEUROLOGY

## 2022-02-08 PROCEDURE — G8427 DOCREV CUR MEDS BY ELIG CLIN: HCPCS | Performed by: PSYCHIATRY & NEUROLOGY

## 2022-02-08 PROCEDURE — 1101F PT FALLS ASSESS-DOCD LE1/YR: CPT | Performed by: PSYCHIATRY & NEUROLOGY

## 2022-02-08 PROCEDURE — G8536 NO DOC ELDER MAL SCRN: HCPCS | Performed by: PSYCHIATRY & NEUROLOGY

## 2022-02-08 RX ORDER — CLONAZEPAM 0.5 MG/1
0.25 TABLET ORAL
Qty: 45 TABLET | Refills: 1 | Status: SHIPPED | OUTPATIENT
Start: 2022-02-08

## 2022-02-08 RX ORDER — HYDROCHLOROTHIAZIDE 25 MG/1
TABLET ORAL
COMMUNITY
Start: 2022-01-14

## 2022-02-08 NOTE — PROGRESS NOTES
Neurology Consult Note      HISTORY PROVIDED BY: patient and spouse    Chief Complaint:   Chief Complaint   Patient presents with    Follow-up    Alzheimers      Subjective:   Pt is a 67 y.o. right handed male last seen in clinic on 3/15/21 in f/u, presenting in Jan, 2021 with one year h/o progressive memory loss noticed by his wife, giving examples of trouble putting hearing aids in correctly, forgetting how to take his medications correctly, repeating himself. Additionally, has shuffling, soft voice, messy hand writing, has fallen out of bed several times, screams and yells and moving arms during sleep. MRI brain at [facility] 1/14/21 per report, CIWM changes and atrophy, no mention of ventriculomegaly or signs of NPH. Pt did not have improvement in parkinsonism on Sinemet and did have increased confusion and hallucinations. Exam in January with MMSE score 25/30, not repeated. He has mild hypophonia, shuffling gait, improved arm swing. Patient's history was concerning for dementia, probable Alzheimer's, and secondary parkinsonism. OA of bilateral knees is also contributing to gait disturbance. History also suggestive of REM behavior disorder, not acting out dreams recently, has not fallen out of bed with rail on bed. Started Aricept 5mg daily x 1 month, then 10mg daily. He is going to wait until they return from [place] in June to start it. Recommended neuropsychological testing to evaluate for dementia. They will call if RBD becomes a problem. He returns for delayed f/u. He was seen by Dr. Zheng Duarteer 11/12/21 and 1/26/22, findings were c/w a mild to mod dementia, likely a combination of AD and VD. MRI brain in Jan, 2021; however, did not reveal significant CIWM changes. He was referred for a sleep evaluation as well. His wife feels like he is improved after starting Aricept, he is more engaged. He still has memory difficulties, talks softly, she thinks hearing is playing a part.  He is eating lunch with friends once a week and son once a week. He is calling friends on the phone and is making sense. He has been having nightmares for the last two weeks, he is yelling and swinging, has not fallen out of bed, the bed rail stops him. He has been having difficulty controlling his BP since the summer, trouble with it spiking. No snoring reported, no known apneas. They are trying to go to [place] next week and returning in middle of May. Previous Testing:  Labs 2020: TSH 2.457, B12 505, CMP unremarkable, CBC unremarkable, LDL 53.4, HDL 54    Past Medical History:   Diagnosis Date    Breast cancer ([facility] 2007    s/p mastectomy and chemo    Carotid stenosis     [facility]    Dementia ([facility]     GERD (gastroesophageal reflux disease)     HTN (hypertension)     Parkinsonism ([facility]     RBD (REM behavioral disorder)     Retinal artery occlusion     left eye    Vitamin D deficiency       Past Surgical History:   Procedure Laterality Date    HX LAP CHOLECYSTECTOMY      HX MASTECTOMY        Social History     Socioeconomic History    Marital status:      Spouse name: Not on file    Number of children: Not on file    Years of education: Not on file    Highest education level: Not on file   Occupational History    Occupation: Retired, sold men's wear to [employer]   Tobacco Use    Smoking status: Former Smoker     Quit date:      Years since quittin.1    Smokeless tobacco: Never Used   Substance and Sexual Activity    Alcohol use:  Yes     Alcohol/week: 2.0 standard drinks     Types: 2 Shots of liquor per week    Drug use: Never    Sexual activity: Not on file   Other Topics Concern    Not on file   Social History Narrative    Lives in [place] with wife     Social Determinants of Health     Financial Resource Strain:     Difficulty of Paying Living Expenses: Not on file   Food Insecurity:     Worried About Running Out of Food in the Last Year: Not on file    [address] in the Last Year: Not on file   Transportation Needs:     Lack of Transportation (Medical): Not on file    Lack of Transportation (Non-Medical): Not on file   Physical Activity:     Days of Exercise per Week: Not on file    Minutes of Exercise per Session: Not on file   Stress:     Feeling of Stress : Not on file   Social Connections:     Frequency of Communication with Friends and Family: Not on file    Frequency of Social Gatherings with Friends and Family: Not on file    Attends Latter day Services: Not on file    Active Member of [address] [place] [address] [employer] or Organizations: Not on file    Attends Club or Organization Meetings: Not on file    Marital Status: Not on file   Intimate Partner Violence:     Fear of Current or Ex-Partner: Not on file    Emotionally Abused: Not on file    Physically Abused: Not on file    Sexually Abused: Not on file   Housing Stability:     Unable to Pay for Housing in the Last Year: Not on file    Number of [place] in the Last Year: Not on file    Unstable Housing in the Last Year: Not on file     Family History   Problem Relation Age of Onset    Cancer Mother     Heart Disease Mother     Cancer Father          Objective:   ROS: Per HPI o/w reviewed and neg    No Known Allergies     Meds:    Current Outpatient Medications:     hydroCHLOROthiazide (HYDRODIURIL) 25 mg tablet, , Disp: , Rfl:     donepeziL (Aricept) 10 mg tablet, Take 1/2 tab by mouth daily x 1 month, then take 1 tab daily, Disp: 90 Tab, Rfl: 3    aspirin-calcium carbonate 81 mg-300 mg calcium(777 mg) tab, Take 81 mg by mouth daily. , Disp: , Rfl:     folic acid/multivit-min/lutein (CENTRUM SILVER PO), Take  by mouth., Disp: , Rfl:     cholecalciferol (Vitamin D3) 25 mcg (1,000 unit) cap, Take  by mouth daily. , Disp: , Rfl:     felodipine (PLENDIL SR) 5 mg 24 hr tablet, Take 5 mg by mouth daily. , Disp: , Rfl:     lisinopriL (PRINIVIL, ZESTRIL) 10 mg tablet, Take 40 mg by mouth daily. , Disp: , Rfl:     clopidogreL (Plavix) 75 mg tab, Take  by mouth., Disp: , Rfl:     pravastatin (PRAVACHOL) 40 mg tablet, Take 40 mg by mouth nightly., Disp: , Rfl:       Imaging:  MRI Results (most recent):  Results from [facility] encounter on 05/21/21    MRI BRAIN W WO CONT     CT Results (most recent):  Results from [place] encounter on 11/26/14    CTA NECK    Narrative  **Final Report**      ICD Codes / Adm. Diagnosis: 362.34   / Transient arterial occlusion o  Examination:  CT NECK ANGIOGRAPHY  - [id number] - Nov 26 2014  8:39AM  Accession No:  [id number]  Reason:  Transient arterial occlusion of retina      REPORT:  EXAM:  CT NECK ANGIOGRAPHY    INDICATION:  Transient arterial occlusion of retina    COMPARISON:  CT angiogram head of 11/25/2014    TECHNIQUE:  Multislice helical axial CT angiography was performed from the aortic arch  to the skull base during uneventful rapid bolus intravenous administration  of 97 mL of Optiray 350. Postprocessing was performed to include MIP and  volume rendered images. CTA NECK  The patient has an aberrant right subclavian artery. Aortic arch anatomy is  otherwise normal. There is mild calcified plaque in the proximal subclavian  arteries bilaterally, without significant stenosis. The common carotid  arteries are patent bilaterally. There is calcified plaque at the right  carotid bifurcation. This results in severe (70-80%) luminal diameter  stenosis. Milder calcified plaque is present at the left carotid bifurcation  resulting in approximately 60% diameter stenosis. NASCET method was utilized  for calculating stenosis. Cavernous carotid calcification with moderate  right internal carotid and mild left internal carotid stenosis is noted, as  demonstrated on the CT angiogram of 11/25/2014. The vertebral arteries are  codominant and patent. The cervical soft tissues are unremarkable. There  are degenerative changes in the cervical spine.     Impression  :  1. Calcified plaque in the right carotid bifurcation with severe (greater  than 70%) stenosis. 2. Mild calcified plaque in the left carotid bifurcation with moderate  (approximately 60%) stenosis. 3. Bilaterally patent vertebral arteries. 4. Aberrant right subclavian artery. This is a normal anatomic variant which  is usually asymptomatic but which can occasionally result in dysphagia. Signing/Reading Doctor: Drew Parada ([id number])  Approved: Drew Parada ([id number])  Nov 26 2014  9:47AM       Reviewed records in [facility] and media tab today    Lab Review   Results for orders placed or performed during the hospital encounter of 11/25/14   POC CREATININE   Result Value Ref Range    Creatinine (POC) 1.0 0.6 - 1.3 MG/DL    GFRAA, POC >60 >60 ml/min/1.73m2    GFRNA, POC >60 >60 ml/min/1.73m2        Exam:  Visit Vitals  /80   Pulse 73   Ht 5' 10\" (1.778 m)   Wt 243 lb (110.2 kg)   SpO2 97%   BMI 34.87 kg/m²     General:  Alert, cooperative, no distress. Head:  Normocephalic, without obvious abnormality, atraumatic. Respiratory:  Heart:   Non labored breathing  Regular rate and rhythm, no murmurs   Neck:      Extremities: Warm, no cyanosis or edema. Pulses: 2+ radial pulses. Neurologic:  MS: Alert, speech- hypophonia. Language intact - see MMSE. Attention and fund of knowledge appropriate.    Cranial Nerves:  II: visual fields    II: pupils    II: optic disc    III,VII: ptosis none   III,IV,VI: extraocular muscles  EOMI, no nystagmus or diplopia   V: facial light touch sensation     VII: facial muscle function   symmetric   VIII: hearing Intact with hearing aids   IX: soft palate elevation     XI: trapezius strength     XI: sternocleidomastoid strength    XII: tongue       Motor: normal bulk and tone, no tremor, Strength: 5/5 throughout, no PD  Sensory:(At Jan, 2020 OV:  intact to LT, PP)  Coordination: FTN and JESUS intact  Gait: Slow shuffling gait, improved arm swing, no tremor  Reflexes: (At Jan, 2020 OV: 2+ symmetric, toes downgoing)  Mini Mental State Exam 1/15/2021   What is the Year 1   What is the Season 1   What is the Date 1   What is the Day 1   What is the Month 1   Where are we State 1   Where are we Country 1   Where are we [place] or [place] 1   Where are we Floor 1   Name three objects, then ask the patient to say them 3   Serial sevens Subtract 7 from 100 in increments 3   Ask for the three objects repeated above 2   Name a pencil 1   Name a watch 1   Have the patient repeat this phrase \"No ifs, ands, or buts\" 1   Three stage command: Take the paper in your right hand 1   Fold the paper in half 0   Put the paper on the floor 0   Read and obey the following: CLOSE YOUR EYES 1   Have the patient write a sentence 1   Have the patient copy a figure 1   Mini Mental Score 25            Assessment/Plan   Pt is a 67 y.o. right handed male presenting in Jan, 2021 with one year h/o progressive memory loss noticed by his wife, giving examples of trouble putting hearing aids in correctly, forgetting how to take his medications correctly, repeating himself. Neuropsychological testing with Dr. Franco Fees 11/12/21 and 1/26/22, with findings c/w a mild to mod dementia, likely a combination of AD and VD. [facility] per report, CIWM changes and atrophy, no mention of ventriculomegaly or signs of NPH. Additionally, has shuffling, soft voice, messy hand writing, and RBD, but denied any clinical improvement on Sinemet and had increased confusion and hallucinations. RBD has worsened. Exam with mild hypophonia, shuffling gait, improved arm swing. Dementia, probable Alzheimer's, and secondary parkinsonism. OA of bilateral knees is also contributing to gait disturbance. Start Clonazepam 0.25mg qhs for REM behavior disorder. Continue Aricept 10mg daily. Ffollow-up in clinic in 6 months, instructed to call in the interim with any questions or concerns. ICD-10-CM ICD-9-CM    1.  Late onset Alzheimer's disease without behavioral disturbance (HCC)  G30.1 331.0     F02.80 294.10    2. RBD (REM behavioral disorder)  G47.52 327.42 clonazePAM (KlonoPIN) 0.5 mg tablet       Signed:   Amira Sanchez MD  2/8/2022

## 2022-02-08 NOTE — LETTER
2/17/2022    Patient: Bob Fitzpatrick   YOB: 1945   Date of Visit: 2/8/2022     Khalida Bills MD  [address] [address]  Via Fax: [phone]    Dear Khalida Bills MD,      Thank you for referring Mr. Ramiro Krishnamurthy to [address] [place] [address] for evaluation. My notes for this consultation are attached. If you have questions, please do not hesitate to call me. I look forward to following your patient along with you.       Sincerely,    Jessica Medeiros MD

## 2022-04-27 RX ORDER — DONEPEZIL HYDROCHLORIDE 10 MG/1
10 TABLET, FILM COATED ORAL
Qty: 90 TABLET | Refills: 3 | Status: SHIPPED | OUTPATIENT
Start: 2022-04-27

## 2022-07-13 ENCOUNTER — OFFICE VISIT (OUTPATIENT)
Dept: SLEEP MEDICINE | Age: 77
End: 2022-07-13
Payer: MEDICARE

## 2022-07-13 VITALS
HEART RATE: 66 BPM | OXYGEN SATURATION: 95 % | TEMPERATURE: 98.4 F | HEIGHT: 70 IN | BODY MASS INDEX: 34.03 KG/M2 | SYSTOLIC BLOOD PRESSURE: 141 MMHG | DIASTOLIC BLOOD PRESSURE: 71 MMHG | WEIGHT: 237.7 LBS

## 2022-07-13 DIAGNOSIS — G47.33 OBSTRUCTIVE SLEEP APNEA (ADULT) (PEDIATRIC): Primary | ICD-10-CM

## 2022-07-13 DIAGNOSIS — G47.52 REM SLEEP BEHAVIOR DISORDER: ICD-10-CM

## 2022-07-13 DIAGNOSIS — I10 PRIMARY HYPERTENSION: ICD-10-CM

## 2022-07-13 PROCEDURE — G8427 DOCREV CUR MEDS BY ELIG CLIN: HCPCS | Performed by: INTERNAL MEDICINE

## 2022-07-13 PROCEDURE — 1123F ACP DISCUSS/DSCN MKR DOCD: CPT | Performed by: INTERNAL MEDICINE

## 2022-07-13 PROCEDURE — 1101F PT FALLS ASSESS-DOCD LE1/YR: CPT | Performed by: INTERNAL MEDICINE

## 2022-07-13 PROCEDURE — G8432 DEP SCR NOT DOC, RNG: HCPCS | Performed by: INTERNAL MEDICINE

## 2022-07-13 PROCEDURE — 99204 OFFICE O/P NEW MOD 45 MIN: CPT | Performed by: INTERNAL MEDICINE

## 2022-07-13 PROCEDURE — G8536 NO DOC ELDER MAL SCRN: HCPCS | Performed by: INTERNAL MEDICINE

## 2022-07-13 PROCEDURE — G8417 CALC BMI ABV UP PARAM F/U: HCPCS | Performed by: INTERNAL MEDICINE

## 2022-07-13 NOTE — PATIENT INSTRUCTIONS
[facility] [place]. [place], [address]  Tel.  [phone]  Fax. [facility] [address]  [place], [address] [facility]  Tel.  [phone]  Fax. [phone] [address]  Tel.  [phone]  Fax. [phone]     Sleep Apnea: After Your Visit  Your Care Instructions  Sleep apnea occurs when you frequently stop breathing for 10 seconds or longer during sleep. It can be mild to severe, based on the number of times per hour that you stop breathing or have slowed breathing. Blocked or narrowed airways in your nose, mouth, or throat can cause sleep apnea. Your airway can become blocked when your throat muscles and tongue relax during sleep. Sleep apnea is common, occurring in 1 out of 20 individuals. Individuals having any of the following characteristics should be evaluated and treated right away due to high risk and detrimental consequences from untreated sleep apnea:  1. Obesity  2. Congestive Heart failure  3. Atrial Fibrillation  4. Uncontrolled Hypertension  5. Type II Diabetes  6. Night-time Arrhythmias  7. Stroke  8. Pulmonary Hypertension  9. High-risk Driving Populations (pilots, truck drivers, etc.)  10. Patients Considering Weight-loss Surgery    How do you know you have sleep apnea? You probably have sleep apnea if you answer 'yes' to 3 or more of the following questions:  S - Have you been told that you Snore? T - Are you often Tired during the day? O - Has anyone Observed you stop breathing while sleeping? P- Do you have (or are being treated for) high blood Pressure? B - Are you obese (Body Mass Index > 35)? A - Is your Age 48years old or older? N - Is your Neck size greater than 16 inches? G - Are you male Gender? A sleep physician can prescribe a breathing device that prevents tissues in the throat from blocking your airway.  Or your doctor may recommend using a dental device (oral breathing device) to help keep your airway open. In some cases, surgery may be needed to remove enlarged tissues in the throat. Follow-up care is a key part of your treatment and safety. Be sure to make and go to all appointments, and call your doctor if you are having problems. It's also a good idea to know your test results and keep a list of the medicines you take. How can you care for yourself at home? · Lose weight, if needed. It may reduce the number of times you stop breathing or have slowed breathing. · Go to bed at the same time every night. · Sleep on your side. It may stop mild apnea. If you tend to roll onto your back, sew a pocket in the back of your pajama top. Put a tennis ball into the pocket, and stitch the pocket shut. This will help keep you from sleeping on your back. · Avoid alcohol and medicines such as sleeping pills and sedatives before bed. · Do not smoke. Smoking can make sleep apnea worse. If you need help quitting, talk to your doctor about stop-smoking programs and medicines. These can increase your chances of quitting for good. · Prop up the head of your bed 4 to 6 inches by putting bricks under the legs of the bed. · Treat breathing problems, such as a stuffy nose, caused by a cold or allergies. · Use a continuous positive airway pressure (CPAP) breathing machine if lifestyle changes do not help your apnea and your doctor recommends it. The machine keeps your airway from closing when you sleep. · If CPAP does not help you, ask your doctor whether you should try other breathing machines. A bilevel positive airway pressure machine has two types of air pressureâone for breathing in and one for breathing out. Another device raises or lowers air pressure as needed while you breathe. · If your nose feels dry or bleeds when using one of these machines, talk with your doctor about increasing moisture in the air. A humidifier may help.   · If your nose is runny or stuffy from using a breathing machine, talk with your doctor about using decongestants or a corticosteroid nasal spray. When should you call for help? Watch closely for changes in your health, and be sure to contact your doctor if:  · You still have sleep apnea even though you have made lifestyle changes. · You are thinking of trying a device such as CPAP. · You are having problems using a CPAP or similar machine. Where can you learn more? Go to [employer]. Enter I737 in the search box to learn more about \"Sleep Apnea: After Your Visit. \"   © 0335-9436 Healthwise, Incorporated. Care instructions adapted under license by [employer] (which disclaims liability or warranty for this information). This care instruction is for use with your licensed healthcare professional. If you have questions about a medical condition or this instruction, always ask your healthcare professional. Jongwyn Manson any warranty or liability for your use of this information. PROPER SLEEP HYGIENE    What to avoid  · Do not have drinks with caffeine, such as coffee or black tea, for 8 hours before bed. · Do not smoke or use other types of tobacco near bedtime. Nicotine is a stimulant and can keep you awake. · Avoid drinking alcohol late in the evening, because it can cause you to wake in the middle of the night. · Do not eat a big meal close to bedtime. If you are hungry, eat a light snack. · Do not drink a lot of water close to bedtime, because the need to urinate may wake you up during the night. · Do not read or watch TV in bed. Use the bed only for sleeping and sexual activity. What to try  · Go to bed at the same time every night, and wake up at the same time every morning. Do not take naps during the day. · Keep your bedroom quiet, dark, and cool. · Get regular exercise, but not within 3 to 4 hours of your bedtime. .  · Sleep on a comfortable pillow and mattress.   · If watching the clock makes you anxious, turn it facing away from you so you cannot see the time. · If you worry when you lie down, start a worry book. Well before bedtime, write down your worries, and then set the book and your concerns aside. · Try meditation or other relaxation techniques before you go to bed. · If you cannot fall asleep, get up and go to another room until you feel sleepy. Do something relaxing. Repeat your bedtime routine before you go to bed again. · Make your house quiet and calm about an hour before bedtime. Turn down the lights, turn off the TV, log off the computer, and turn down the volume on music. This can help you relax after a busy day. Drowsy Driving  The [address] [place] [address] Road Traffic Safety Administration cites drowsiness as a causing factor in more than 990,557 police reported crashes annually, resulting in 76,000 injuries and 1,500 deaths. Other surveys suggest 55% of people polled have driven while drowsy in the past year, 23% had fallen asleep but not crashed, 3% crashed, and 2% had and accident due to drowsy driving. Who is at risk? Young Drivers: One study of drowsy driving accidents states that 55% of the drivers were under 25 years. Of those, 75% were male. Shift Workers and Travelers: People who work overnight or travel across time zones frequently are at higher risk of experiencing Circadian Rhythm Disorders. They are trying to work and function when their body is programed to sleep. Sleep Deprived: Lack of sleep has a serious impact on your ability to pay attention or focus on a task. Consistently getting less than the average of 8 hours your body needs creates partial or cumulative sleep deprivation. Untreated Sleep Disorders: Sleep Apnea, Narcolepsy, R.L.S., and other sleep disorders (untreated) prevent a person from getting enough restful sleep. This leads to excessive daytime sleepiness and increases the risk for drowsy driving accidents by up to 7 times.   Medications / Alcohol: Even over the counter medications can cause drowsiness. Medications that impair a drivers attention should have a warning label. Alcohol naturally makes you sleepy and on its own can cause accidents. Combined with excessive drowsiness its effects are amplified. Signs of Drowsy Driving:   * You don't remember driving the last few miles   * You may drift out of your jennifer   * You are unable to focus and your thoughts wander   * You may yawn more often than normal   * You have difficulty keeping your eyes open / nodding off   * Missing traffic signs, speeding, or tailgating  Prevention-   Good sleep hygiene, lifestyle and behavioral choices have the most impact on drowsy driving. There is no substitute for sleep and the average person requires 8 hours nightly. If you find yourself driving drowsy, stop and sleep. Consider the sleep hygiene tips provided during your visit as well. Medication Refill Policy: Refills for all medications require 1 week advance notice. Please have your pharmacy fax a refill request. We are unable to fax, or call in \"controled substance\" medications and you will need to pick these prescriptions up from our office. [employer] Activation    Thank you for requesting access to [employer]. Please follow the instructions below to securely access and download your online medical record. [employer] allows you to send messages to your doctor, view your test results, renew your prescriptions, schedule appointments, and more. How Do I Sign Up? 1. In your internet browser, go to [email or web address]. 2. Click on the First Time User? Click Here link in the Sign In box. You will see the New Member Sign Up page. 3. Enter your [employer] Access Code exactly as it appears below. You will not need to use this code after youve completed the sign-up process. If you do not sign up before the expiration date, you must request a new code. [employer] Access Code:  Activation code not generated  Current [employer] Status: Active (This is the date your [employer] access code will )    4. Enter the last four digits of your Social Security Number (xxxx) and Date of Birth (mm/dd/yyyy) as indicated and click Submit. You will be taken to the next sign-up page. 5. Create a [employer] ID. This will be your [employer] login ID and cannot be changed, so think of one that is secure and easy to remember. 6. Create a [employer] password. You can change your password at any time. 7. Enter your Password Reset Question and Answer. This can be used at a later time if you forget your password. 8. Enter your e-mail address. You will receive e-mail notification when new information is available in [address] 9. Click Sign Up. You can now view and download portions of your medical record. 10. Click the Download Summary menu link to download a portable copy of your medical information. Additional Information    If you have questions, please call [phone]. Remember, [employer] is NOT to be used for urgent needs. For medical emergencies, dial 911.

## 2022-07-13 NOTE — Clinical Note
Thank you for the referral.  I will keep you informed of his progress.   [address],  [place]

## 2022-07-13 NOTE — PROGRESS NOTES
[address] [facility] [place]. [place], [address]  Tel.  [phone]  Fax. 100 [facility] 60  [place], [address] [facility]  Tel.  [phone]  Fax. [phone] [address]   Tel.  [phone]  Fax. [phone]         Subjective: Patty Becerra is an 67 y.o. male referred for evaluation for a sleep disorder. He complains of excessive daytime sleepiness, nightmares that he may act out associated with snoring, he was advised to have a sleep test.  Symptoms began 1 year ago, unchanged since that time. He usually can fall asleep in 10 minutes. Family or house members note snoring, flailing arms during a nightmare (occurring less often now). He denies snorting, periods of not breathing. Patty Becerra does wake up frequently at night. He is not bothered by waking up too early and left unable to get back to sleep. He actually sleeps about 8 hours at night and wakes up about 3 times during the night. He does not work shifts: [facility] Rosalind Chandler indicates he does not get too little sleep at night. His bedtime is 2330. He awakens at 0800. He   take naps. . He has the following observed behaviors: Sleep talking; Nightmares. Other remarks: vivid dreams  He was given a prescription for clonazepam by his neurologist but has not taken it. He does have a bed rail up and his wife says the nightmares are less frequent now. He has not fallen out of bed since the bedrail has been in place. [place] Sleepiness Score: 12   which reflect mild daytime drowsiness. No Known Allergies      Current Outpatient Medications:     donepeziL (ARICEPT) 10 mg tablet, Take 1 Tablet by mouth nightly., Disp: 90 Tablet, Rfl: 3    hydroCHLOROthiazide (HYDRODIURIL) 25 mg tablet, , Disp: , Rfl:     clonazePAM (KlonoPIN) 0.5 mg tablet, Take 0.5 Tablets by mouth nightly.  Max Daily Amount: 0.25 mg., Disp: 45 Tablet, Rfl: 1    aspirin-calcium carbonate 81 mg-300 mg calcium(777 mg) tab, Take 81 mg by mouth daily. , Disp: , Rfl:     folic acid/multivit-min/lutein (CENTRUM SILVER PO), Take  by mouth., Disp: , Rfl:     cholecalciferol (Vitamin D3) 25 mcg (1,000 unit) cap, Take  by mouth daily. , Disp: , Rfl:     felodipine (PLENDIL SR) 5 mg 24 hr tablet, Take 5 mg by mouth daily. , Disp: , Rfl:     lisinopriL (PRINIVIL, ZESTRIL) 10 mg tablet, Take 40 mg by mouth daily. , Disp: , Rfl:     clopidogreL (Plavix) 75 mg tab, Take  by mouth., Disp: , Rfl:     pravastatin (PRAVACHOL) 40 mg tablet, Take 40 mg by mouth nightly., Disp: , Rfl:      He  has a past medical history of Breast cancer ([facility] [address] (2007), Carotid stenosis, Dementia ([facility] [address] GERD (gastroesophageal reflux disease), HTN (hypertension), Parkinsonism ([facility] [address] RBD (REM behavioral disorder), Retinal artery occlusion, and Vitamin D deficiency. He  has a past surgical history that includes hx mastectomy and hx lap cholecystectomy. He family history includes Cancer in his father and mother; Heart Disease in his mother. He  reports that he quit smoking about 23 years ago. He has never used smokeless tobacco. He reports current alcohol use of about 2.0 standard drinks of alcohol per week. He reports that he does not use drugs. Review of Systems:  Constitutional: some mild weight loss  Eyes:  No blurred vision. CVS:  No significant chest pain  Pulm:  No significant shortness of breath  GI:  No significant nausea or vomiting  :  No significant nocturia  Musculoskeletal:  No significant joint pain at night, does have arthritis of knees that bothers him sometimes during the day  Skin:  No significant rashes  Neuro:  No significant dizziness   Psych:  No active mood issues    Sleep Review of Systems: notable for no difficulty falling asleep; infrequent awakenings at night;   Unaware of dreaming noted; + nightmares (noted by wife but patient does not recall) ; no early morning headaches; + memory problem      Objective:     Visit Vitals  BP (!) 141/71   Pulse 66   Temp 98.4 °F (36.9 °C)   Ht 5' 10\" (1.778 m)   Wt 237 lb 11.2 oz (107.8 kg)   SpO2 95%   BMI 34.11 kg/m²         General:   Not in acute distress   Eyes:  Anicteric sclerae, no obvious strabismus   Nose:  No obvious nasal septum deviation    Oropharynx:   Class 3 oropharyngeal outlet, thick tongue base,  low-lying soft palate,     Tonsils:   tonsils are absent   Neck:    ; midline trachea   Chest/Lungs:  Equal lung expansion, clear on auscultation    CVS:  Normal rate, regular rhythm; no JVD   Skin:  Warm to touch; no obvious rashes   Neuro:  No focal deficits ; no obvious tremor    Psych:  Normal affect,  normal countenance;          Assessment:       ICD-10-CM ICD-9-CM    1. Obstructive sleep apnea (adult) (pediatric)  G47.33 327.23 CANCELED: POLYSOMNOGRAPHY 1 NIGHT   2. REM sleep behavior disorder  G47.52 327.42 POLYSOMNOGRAPHY 1 NIGHT   3. Primary hypertension  I10 401.9          Plan:     * The patient currently has a High Risk for having sleep apnea. STOP-BANG score 5.  * PSG was ordered for initial evaluation. We will follow the American Academy of Sleep Medicine protocol regarding split-night procedures and offering a trial of Positive Airway Pressure (CPAP, BPAP, etc.)  Falls precautions/wife will stay due to patient's memory problems and is hard of hearing. * He was provided information on sleep apnea including coresponding risk factors and the importance of proper treatment. * Counseling was provided regarding proper sleep hygiene and safe driving. * Return for follow-up shortly after sleep study to go over results and to discuss treatment options if indicated. 2.REM Behavior Disorder - I have reviewed the importance of ensuring a safe sleeping environment. Sleeping in a sleeping bag will keep arms and legs contained and avoid having the bed partner hurt. Separate beds is an option or sleeping on the floor or in a recliner if it prevents getting out of the bed. Medications that can aggravated REM behavior disorder were reviewed. I reviewed pharmacologic treatments for REM behavior disorder which we will discuss again after the sleep study. He already has the clonazepam but has not started it yet. 3. Hypertension - he continues on his current regimen. I have reviewed the relationship between hypertension as it relates to sleep-disordered breathing. All of their questions addressed. Thank you for allowing us to participate in your patient's medical care. We'll keep you updated on these investigations.   Electronically signed by    Maik Green MD  Diplomate in Sleep Medicine  [facility]    7/13/2022

## 2022-10-06 ENCOUNTER — OFFICE VISIT (OUTPATIENT)
Dept: NEUROLOGY | Age: 77
End: 2022-10-06
Payer: MEDICARE

## 2022-10-06 VITALS
OXYGEN SATURATION: 96 % | HEART RATE: 81 BPM | SYSTOLIC BLOOD PRESSURE: 118 MMHG | DIASTOLIC BLOOD PRESSURE: 70 MMHG | HEIGHT: 70 IN | WEIGHT: 234 LBS | RESPIRATION RATE: 18 BRPM | BODY MASS INDEX: 33.5 KG/M2

## 2022-10-06 DIAGNOSIS — G47.52 RBD (REM BEHAVIORAL DISORDER): ICD-10-CM

## 2022-10-06 DIAGNOSIS — F02.80 LATE ONSET ALZHEIMER'S DISEASE WITHOUT BEHAVIORAL DISTURBANCE (HCC): Primary | ICD-10-CM

## 2022-10-06 DIAGNOSIS — G30.1 LATE ONSET ALZHEIMER'S DISEASE WITHOUT BEHAVIORAL DISTURBANCE (HCC): Primary | ICD-10-CM

## 2022-10-06 PROCEDURE — 1101F PT FALLS ASSESS-DOCD LE1/YR: CPT | Performed by: PSYCHIATRY & NEUROLOGY

## 2022-10-06 PROCEDURE — G8536 NO DOC ELDER MAL SCRN: HCPCS | Performed by: PSYCHIATRY & NEUROLOGY

## 2022-10-06 PROCEDURE — G8417 CALC BMI ABV UP PARAM F/U: HCPCS | Performed by: PSYCHIATRY & NEUROLOGY

## 2022-10-06 PROCEDURE — 1123F ACP DISCUSS/DSCN MKR DOCD: CPT | Performed by: PSYCHIATRY & NEUROLOGY

## 2022-10-06 PROCEDURE — 99213 OFFICE O/P EST LOW 20 MIN: CPT | Performed by: PSYCHIATRY & NEUROLOGY

## 2022-10-06 PROCEDURE — G8427 DOCREV CUR MEDS BY ELIG CLIN: HCPCS | Performed by: PSYCHIATRY & NEUROLOGY

## 2022-10-06 PROCEDURE — G8432 DEP SCR NOT DOC, RNG: HCPCS | Performed by: PSYCHIATRY & NEUROLOGY

## 2022-10-06 NOTE — PROGRESS NOTES
Chief Complaint   Patient presents with    Follow-up     Alzheimer ; wife report patient is doing well, no concerns today       Vitals:    10/06/22 1307   BP: 118/70   Pulse: 81   Resp: 18   SpO2: 96%   Weight: 234 lb (106.1 kg)   Height: 5' 10\" (1.778 m)

## 2022-10-06 NOTE — PROGRESS NOTES
Neurology Consult Note      HISTORY PROVIDED BY: patient and spouse    Chief Complaint:   Chief Complaint   Patient presents with    Follow-up     Alzheimer ; wife report patient is doing well, no concerns today      Subjective:   Pt is a 65 y.o. right handed male last seen in clinic on 2/8/22 presenting in Jan, 2021 with one year h/o progressive memory loss noticed by his wife, giving examples of trouble putting hearing aids in correctly, forgetting how to take his medications correctly, repeating himself. Neuropsychological testing with Dr. Jessy Warner 11/12/21 and 1/26/22, with findings c/w a mild to mod dementia, likely a combination of AD and VD. MRI brain at [facility] 1/14/21 per report, CIWM changes and atrophy, no mention of ventriculomegaly or signs of NPH. Additionally, has shuffling, soft voice, messy hand writing, and RBD, but denied any clinical improvement on Sinemet and had increased confusion and hallucinations. RBD was worsened at last visit. Exam with mild hypophonia, shuffling gait, improved arm swing. Dementia, probable Alzheimer's, and secondary parkinsonism. OA of bilateral knees is also contributing to gait disturbance. Started Clonazepam 0.25mg qhs for REM behavior disorder. Continued Aricept 10mg daily. She returns for fu. He never started the clonazepam, they were leaving for [place] shortly after the visit and wanted to wait to start it. Has not had any more episodes of acting his dreams, so he hasn't started it. They feel his memory is stable. Previous Testing:  -Labs 12/18/2020: TSH 2.457, B12 505, CMP unremarkable, CBC unremarkable, LDL 53.4, HDL 54  -Neuropsych testing with Dr. Jessy Warner 11/12/21 and 1/26/22, findings were c/w a mild to mod dementia  -MRI brain at [facility] 1/14/21 per report, CIWM changes and atrophy, no mention of ventriculomegaly or signs of NPH.      Past Medical History:   Diagnosis Date    Breast cancer ([facility] [address] 2007    s/p mastectomy and chemo    Carotid stenosis [facility]    [facility]     GERD (gastroesophageal reflux disease)     HTN (hypertension)     Parkinsonism (HCC)     RBD (REM behavioral disorder)     Retinal artery occlusion     left eye    Vitamin D deficiency       Past Surgical History:   Procedure Laterality Date    HX LAP CHOLECYSTECTOMY      HX MASTECTOMY        Social History     Socioeconomic History    Marital status:      Spouse name: Not on file    Number of children: Not on file    Years of education: Not on file    Highest education level: Not on file   Occupational History    Occupation: Retired, sold men's wear to [employer]   Tobacco Use    Smoking status: Former     Types: Cigarettes     Quit date:      Years since quittin.7    Smokeless tobacco: Never   Vaping Use    Vaping Use: Never used   Substance and Sexual Activity    Alcohol use:  Yes     Alcohol/week: 2.0 standard drinks     Types: 2 Shots of liquor per week     Comment: rare    Drug use: Never    Sexual activity: Not on file   Other Topics Concern     Service Not Asked    Blood Transfusions Not Asked    Caffeine Concern Not Asked    Occupational Exposure Not Asked    Hobby Hazards Not Asked    Sleep Concern Not Asked    Stress Concern Not Asked    Weight Concern Not Asked    Special Diet Not Asked    Back Care Not Asked    Exercise Not Asked    Bike Helmet Not Asked    Seat Belt Not Asked    Self-Exams Not Asked   Social History Narrative    Lives in [place] with wife     Social Determinants of Health     Financial Resource Strain: Not on file   Food Insecurity: Not on file   Transportation Needs: Not on file   Physical Activity: Not on file   Stress: Not on file   Social Connections: Not on file   Intimate Partner Violence: Not on file   Housing Stability: Not on file     Family History   Problem Relation Age of Onset    Cancer Mother     Heart Disease Mother     Cancer Father          Objective:   ROS: Per HPI o/w reviewed and neg    No Known Allergies Meds:    Current Outpatient Medications:     donepeziL (ARICEPT) 10 mg tablet, Take 1 Tablet by mouth nightly., Disp: 90 Tablet, Rfl: 3    hydroCHLOROthiazide (HYDRODIURIL) 25 mg tablet, , Disp: , Rfl:     aspirin-calcium carbonate 81 mg-300 mg calcium(777 mg) tab, Take 81 mg by mouth daily. , Disp: , Rfl:     folic acid/multivit-min/lutein (CENTRUM SILVER PO), Take  by mouth., Disp: , Rfl:     cholecalciferol (VITAMIN D3) 25 mcg (1,000 unit) cap, Take  by mouth daily. , Disp: , Rfl:     lisinopriL (PRINIVIL, ZESTRIL) 10 mg tablet, Take 40 mg by mouth daily. , Disp: , Rfl:     clopidogreL (PLAVIX) 75 mg tab, Take  by mouth., Disp: , Rfl:     pravastatin (PRAVACHOL) 40 mg tablet, Take 40 mg by mouth nightly., Disp: , Rfl:     clonazePAM (KlonoPIN) 0.5 mg tablet, Take 0.5 Tablets by mouth nightly. Max Daily Amount: 0.25 mg. (Patient not taking: Reported on 10/6/2022), Disp: 45 Tablet, Rfl: 1    felodipine (PLENDIL SR) 5 mg 24 hr tablet, Take 5 mg by mouth daily. 5mg in the morning and 5mg at night, Disp: , Rfl:       Imaging:  MRI Results (most recent):  Results from Abstract encounter on 05/21/21    MRI BRAIN W WO CONT     CT Results (most recent):  Results from Hospital Encounter encounter on 11/26/14    CTA NECK    Narrative  **Final Report**      ICD Codes / Adm. Diagnosis: 362.34   / Transient arterial occlusion o  Examination:  CT NECK ANGIOGRAPHY  - [id number] - Nov 26 2014  8:39AM  Accession No:  [id number]  Reason:  Transient arterial occlusion of retina      REPORT:  EXAM:  CT NECK ANGIOGRAPHY    INDICATION:  Transient arterial occlusion of retina    COMPARISON:  CT angiogram head of 11/25/2014    TECHNIQUE:  Multislice helical axial CT angiography was performed from the aortic arch  to the skull base during uneventful rapid bolus intravenous administration  of 97 mL of Optiray 350. Postprocessing was performed to include MIP and  volume rendered images.     CTA NECK  The patient has an aberrant right subclavian artery. Aortic arch anatomy is  otherwise normal. There is mild calcified plaque in the proximal subclavian  arteries bilaterally, without significant stenosis. The common carotid  arteries are patent bilaterally. There is calcified plaque at the right  carotid bifurcation. This results in severe (70-80%) luminal diameter  stenosis. Milder calcified plaque is present at the left carotid bifurcation  resulting in approximately 60% diameter stenosis. NASCET method was utilized  for calculating stenosis. Cavernous carotid calcification with moderate  right internal carotid and mild left internal carotid stenosis is noted, as  demonstrated on the CT angiogram of 11/25/2014. The vertebral arteries are  codominant and patent. The cervical soft tissues are unremarkable. There  are degenerative changes in the cervical spine. Impression  :  1. Calcified plaque in the right carotid bifurcation with severe (greater  than 70%) stenosis. 2. Mild calcified plaque in the left carotid bifurcation with moderate  (approximately 60%) stenosis. 3. Bilaterally patent vertebral arteries. 4. Aberrant right subclavian artery. This is a normal anatomic variant which  is usually asymptomatic but which can occasionally result in dysphagia. Signing/Reading Doctor: Alondra Askew ([id number])  Approved: Alondra Askew ([id number])  Nov 26 2014  9:47AM       Reviewed records in [facility] and media tab today    Lab Review   Results for orders placed or performed during the hospital encounter of 11/25/14   POC CREATININE   Result Value Ref Range    Creatinine (POC) 1.0 0.6 - 1.3 MG/DL    GFRAA, POC >60 >60 ml/min/1.73m2    GFRNA, POC >60 >60 ml/min/1.73m2        Exam:  Visit Vitals  /70 (BP 1 Location: Left upper arm, BP Patient Position: Sitting, BP Cuff Size: Adult)   Pulse 81   Resp 18   Ht 5' 10\" (1.778 m)   Wt 234 lb (106.1 kg)   SpO2 96%   BMI 33.58 kg/m²     General:  Alert, cooperative, no distress.    Head: Normocephalic, without obvious abnormality, atraumatic. Respiratory:  Heart:   Non labored breathing  Regular rate and rhythm, no murmurs   Neck:      Extremities: Warm, no cyanosis or edema. Pulses: 2+ radial pulses. Neurologic:  MS: Alert, speech- hypophonia. Language intact - see MMSE. Attention and fund of knowledge appropriate.    Cranial Nerves:  II: visual fields    II: pupils    II: optic disc    III,VII: ptosis none   III,IV,VI: extraocular muscles  EOMI, no nystagmus or diplopia   V: facial light touch sensation     VII: facial muscle function   symmetric   VIII: hearing Intact with hearing aids   IX: soft palate elevation     XI: trapezius strength     XI: sternocleidomastoid strength    XII: tongue       Motor: normal bulk and tone, no tremor, Strength: 5/5 throughout, no PD  Sensory:  Coordination: FTN and JESUS intact  Gait: Slow shuffling gait, normal arm swing, no tremor  Reflexes:   Mini Mental State Exam 10/6/2022 1/15/2021   What is the Year 1 1   What is the Season 1 1   What is the Date 0 1   What is the Day 1 1   What is the Month 1 1   Where are we State 1 1   Where are we Country 1 1   Where are we [address] [place] [address] or [facility] 1 1   Where are we Floor 1 1   Name three objects, then ask the patient to say them 3 3   Serial sevens Subtract 7 from 100 in increments 3 3   Ask for the three objects repeated above 1 2   Name a pencil 1 1   Name a watch 1 1   Have the patient repeat this phrase \"No ifs, ands, or buts\" 1 1   Three stage command: Take the paper in your right hand 1 1   Fold the paper in half 1 0   Put the paper on the floor 1 0   Read and obey the following: CLOSE YOUR EYES 1 1   Have the patient write a sentence 1 1   Have the patient copy a figure 0 1   Mini Mental Score 24 25   Some recent data might be hidden            Assessment/Plan   Pt is a 65 y.o. right handed male presenting in Jan, 2021 with one year h/o progressive memory loss noticed by his wife, giving examples of trouble putting hearing aids in correctly, forgetting how to take his medications correctly, repeating himself, with dementia confirmed on Neuropsychological testing with Dr. Yaneth Kennedy 11/12/21 and 1/26/22 and no structural etiology on MRI brain. Additionally, has shuffling, soft voice, messy hand writing, and RBD, but denied any clinical improvement on Sinemet and had increased confusion and hallucinations. RBD has not been an issue recently. Exam with MMSE Score 24/30 missing 1 orientation, 2 attn, 2 at delayed recall, and unable to copy, stable from 1/15/21, mild hypophonia, shuffling gait. Dementia, probable Alzheimer's, and secondary parkinsonism. Continue Aricept 10mg daily, will hold off on adding Namenda at this time. Encouraged to start Clonazepam 0.25mg qhs for REM behavior disorder if he has recurrent episodes. Follow-up in clinic in 6 months, instructed to call in the interim with any questions or concerns. ICD-10-CM ICD-9-CM    1. Late onset Alzheimer's disease without behavioral disturbance (HCC)  G30.1 331.0     F02.80 294.10       2. RBD (REM behavioral disorder)  G47.52 327.42             Signed:   Meli Santos MD  10/6/2022

## 2022-10-06 NOTE — LETTER
11/1/2022    Patient: Areli Luz   YOB: 1945   Date of Visit: 10/6/2022     Kim Martinez MD  [address] [place] [phone]  Via Fax: [phone]    Dear Kim Martinez MD,      Thank you for referring Mr. Shoshana Whittington to [address] [place] [address] for evaluation. My notes for this consultation are attached. If you have questions, please do not hesitate to call me. I look forward to following your patient along with you.       Sincerely,    Blossom Molina MD

## 2023-01-09 RX ORDER — DONEPEZIL HYDROCHLORIDE 10 MG/1
10 TABLET, FILM COATED ORAL
Qty: 90 TABLET | Refills: 2 | Status: SHIPPED | OUTPATIENT
Start: 2023-01-09